# Patient Record
Sex: MALE | Race: WHITE | HISPANIC OR LATINO | URBAN - METROPOLITAN AREA
[De-identification: names, ages, dates, MRNs, and addresses within clinical notes are randomized per-mention and may not be internally consistent; named-entity substitution may affect disease eponyms.]

---

## 2022-09-01 ENCOUNTER — HOSPITAL ENCOUNTER (EMERGENCY)
Facility: HOSPITAL | Age: 37
Discharge: HOME | End: 2022-09-01
Attending: EMERGENCY MEDICINE

## 2022-09-01 VITALS
TEMPERATURE: 98.5 F | BODY MASS INDEX: 26.6 KG/M2 | SYSTOLIC BLOOD PRESSURE: 133 MMHG | WEIGHT: 190 LBS | HEART RATE: 69 BPM | RESPIRATION RATE: 18 BRPM | HEIGHT: 71 IN | OXYGEN SATURATION: 98 % | DIASTOLIC BLOOD PRESSURE: 107 MMHG

## 2022-09-01 DIAGNOSIS — Z13.9 ENCOUNTER FOR MEDICAL SCREENING EXAMINATION: Primary | ICD-10-CM

## 2022-09-01 PROCEDURE — 99282 EMERGENCY DEPT VISIT SF MDM: CPT | Performed by: PHYSICIAN ASSISTANT

## 2022-09-01 PROCEDURE — 99283 EMERGENCY DEPT VISIT LOW MDM: CPT

## 2022-09-01 NOTE — ED PROVIDER NOTES
History  Chief Complaint   Patient presents with    Medical Problem     Pt states yesterday he has been confused, fatigued, coughing up blood and having bloody stools, been feeling dizzy  Pt states this all started after he was sitting in his car and smoke was coming out of his vents that smelled"gassy"     Pt with no PMH, no PSH,   Pt does admit to smoking tobacco and prior cocaine abuse, last use about 1 month ago  presents to ED for evaluation of "getting gassed last night"  Pt states yesterday he was in his car, turned on the [facility] and something came out of it, and the next thing he knows, he was awoken by police in a field this am   Pt states he was confused, and "wants his organs checked", coughing and feeling dizzy  Pt states he has been eating and drinking  Pt was shopping in [place], and EMS states he called 911 to get checked  None       History reviewed  No pertinent past medical history  History reviewed  No pertinent surgical history  History reviewed  No pertinent family history  I have reviewed and agree with the history as documented  E-Cigarette/Vaping     E-Cigarette/Vaping Substances     Social History     Tobacco Use    Smoking status: Heavy Tobacco Smoker     Packs/day: 0 50     Types: Cigarettes    Smokeless tobacco: Never Used   Substance Use Topics    Alcohol use: Yes     Comment: occ    Drug use: Not Currently       Review of Systems   Constitutional: Positive for fatigue  Negative for chills and fever  HENT: Negative for hearing loss and sore throat  Respiratory: Positive for cough  Negative for shortness of breath  Cardiovascular: Negative for chest pain and leg swelling  Gastrointestinal: Negative for abdominal pain, diarrhea, nausea and vomiting  Genitourinary: Negative for dysuria, frequency and hematuria  Musculoskeletal: Negative for arthralgias and myalgias  Skin: Negative for pallor and rash  Neurological: Positive for light-headedness  Negative for dizziness, weakness and headaches  Psychiatric/Behavioral: Negative for behavioral problems  All other systems reviewed and are negative  Physical Exam  Physical Exam  Vitals and nursing note reviewed  Constitutional:       General: He is not in acute distress  Appearance: Normal appearance  He is well-developed  He is not ill-appearing  HENT:      Head: Normocephalic and atraumatic  Right Ear: External ear normal       Left Ear: External ear normal       Nose: Nose normal       Mouth/Throat:      Mouth: Mucous membranes are moist       Pharynx: Oropharynx is clear  Eyes:      Conjunctiva/sclera: Conjunctivae normal    Cardiovascular:      Rate and Rhythm: Normal rate  Pulmonary:      Effort: Pulmonary effort is normal  No respiratory distress  Musculoskeletal:         General: Normal range of motion  Cervical back: Normal range of motion and neck supple  Right lower leg: No edema  Left lower leg: No edema  Skin:     General: Skin is warm and dry  Capillary Refill: Capillary refill takes less than 2 seconds  Findings: No rash  Neurological:      General: No focal deficit present  Mental Status: He is alert and oriented to person, place, and time  Mental status is at baseline  Cranial Nerves: No cranial nerve deficit  Motor: No weakness        Gait: Gait normal    Psychiatric:         Mood and Affect: Mood normal          Behavior: Behavior normal          Vital Signs  ED Triage Vitals [09/01/22 1547]   Temperature Pulse Respirations Blood Pressure SpO2   98 5 °F (36 9 °C) 69 18 (!) 133/107 98 %      Temp Source Heart Rate Source Patient Position - Orthostatic VS BP Location FiO2 (%)   Oral Monitor Sitting Left arm --      Pain Score       --           Vitals:    09/01/22 1547   BP: (!) 133/107   Pulse: 69   Patient Position - Orthostatic VS: Sitting         Visual Acuity      ED Medications  Medications - No data to display    Diagnostic Studies  Results Reviewed     None                 No orders to display              Procedures  Procedures         ED Course                               SBIRT 20yo+    Flowsheet Row Most Recent Value   SBIRT (23 yo +)    In order to provide better care to our patients, we are screening all of our patients for alcohol and drug use  Would it be okay to ask you these screening questions? No Filed at: 2022 1605                    [facility]  Number of Diagnoses or Management Options  Encounter for medical screening examination  Diagnosis management comments: Offered lab work to evaluate pt for possible toxins, drugs, effects of toxins, dehydration, anemia, pt refusing stating "blood work won't show anything", specifically asking to be tested for an unknown gas  Further explained to pt that I can't do that, so pt did not want anything further done, pt competent, [id number] able to make decisions, has somewhere to go and someone to get him, pt dc to FU with PCP as needed  No old records to review  Pt initially said  was 1984, then said it was 1984      Disposition  Final diagnoses:   Encounter for medical screening examination     Time reflects when diagnosis was documented in both MDM as applicable and the Disposition within this note     Time User Action Codes Description Comment    2022  4:10 PM Coy Hodgkins, Dózsa György  77  [[id number] [id number]] Encounter for medical screening examination       ED Disposition     ED Disposition   Discharge    Condition   Stable    Date/Time   Thu Sep 1, 2022  4:10 PM    [address] discharge to home/self care  Follow-up Information    None         There are no discharge medications for this patient  No discharge procedures on file      PDMP Review     None          ED Provider  Electronically Signed by           Robin Romero PA-C  [id number]